# Patient Record
Sex: FEMALE | Race: WHITE | NOT HISPANIC OR LATINO | Employment: UNEMPLOYED | URBAN - METROPOLITAN AREA
[De-identification: names, ages, dates, MRNs, and addresses within clinical notes are randomized per-mention and may not be internally consistent; named-entity substitution may affect disease eponyms.]

---

## 2020-10-22 ENCOUNTER — OFFICE VISIT (OUTPATIENT)
Dept: PRIMARY CARE CLINIC | Facility: CLINIC | Age: 20
End: 2020-10-22
Payer: MEDICAID

## 2020-10-22 VITALS
RESPIRATION RATE: 18 BRPM | OXYGEN SATURATION: 95 % | DIASTOLIC BLOOD PRESSURE: 64 MMHG | SYSTOLIC BLOOD PRESSURE: 102 MMHG | WEIGHT: 210 LBS | BODY MASS INDEX: 35.85 KG/M2 | HEIGHT: 64 IN | HEART RATE: 85 BPM

## 2020-10-22 DIAGNOSIS — G89.29 CHRONIC HIP PAIN, RIGHT: ICD-10-CM

## 2020-10-22 DIAGNOSIS — E53.8 VITAMIN B12 DEFICIENCY: ICD-10-CM

## 2020-10-22 DIAGNOSIS — Z01.419 WELL WOMAN EXAM WITH ROUTINE GYNECOLOGICAL EXAM: ICD-10-CM

## 2020-10-22 DIAGNOSIS — Z30.8 ENCOUNTER FOR OTHER CONTRACEPTIVE MANAGEMENT: ICD-10-CM

## 2020-10-22 DIAGNOSIS — G89.29 CHRONIC FOOT PAIN, RIGHT: ICD-10-CM

## 2020-10-22 DIAGNOSIS — E03.8 OTHER SPECIFIED HYPOTHYROIDISM: ICD-10-CM

## 2020-10-22 DIAGNOSIS — G43.009 MIGRAINE WITHOUT AURA AND WITHOUT STATUS MIGRAINOSUS, NOT INTRACTABLE: ICD-10-CM

## 2020-10-22 DIAGNOSIS — E16.2 HYPOGLYCEMIA: ICD-10-CM

## 2020-10-22 DIAGNOSIS — R35.0 URINE FREQUENCY: ICD-10-CM

## 2020-10-22 DIAGNOSIS — G44.209 TENSION HEADACHE: ICD-10-CM

## 2020-10-22 DIAGNOSIS — E55.9 VITAMIN D DEFICIENCY: ICD-10-CM

## 2020-10-22 DIAGNOSIS — Z00.00 ANNUAL PHYSICAL EXAM: ICD-10-CM

## 2020-10-22 DIAGNOSIS — M79.671 CHRONIC FOOT PAIN, RIGHT: ICD-10-CM

## 2020-10-22 DIAGNOSIS — M25.551 CHRONIC HIP PAIN, RIGHT: ICD-10-CM

## 2020-10-22 PROCEDURE — 99204 PR OFFICE/OUTPT VISIT, NEW, LEVL IV, 45-59 MIN: ICD-10-PCS | Mod: S$GLB,,, | Performed by: NURSE PRACTITIONER

## 2020-10-22 PROCEDURE — 99204 OFFICE O/P NEW MOD 45 MIN: CPT | Mod: S$GLB,,, | Performed by: NURSE PRACTITIONER

## 2020-10-22 RX ORDER — NORGESTIMATE AND ETHINYL ESTRADIOL 7DAYSX3 28
KIT ORAL
COMMUNITY
Start: 2020-08-25 | End: 2020-10-22 | Stop reason: SDUPTHER

## 2020-10-22 RX ORDER — NORGESTIMATE AND ETHINYL ESTRADIOL 7DAYSX3 28
KIT ORAL
Qty: 30 TABLET | Refills: 0 | Status: SHIPPED | OUTPATIENT
Start: 2020-10-22 | End: 2020-10-22 | Stop reason: SDUPTHER

## 2020-10-22 RX ORDER — BUTALBITAL, ASPIRIN, AND CAFFEINE 325; 50; 40 MG/1; MG/1; MG/1
1 CAPSULE ORAL EVERY 6 HOURS PRN
Qty: 90 CAPSULE | Refills: 0 | Status: SHIPPED | OUTPATIENT
Start: 2020-10-22 | End: 2021-10-21 | Stop reason: SDUPTHER

## 2020-10-22 RX ORDER — NORGESTIMATE AND ETHINYL ESTRADIOL 7DAYSX3 28
KIT ORAL
Qty: 30 TABLET | Refills: 0 | Status: SHIPPED | OUTPATIENT
Start: 2020-10-22 | End: 2020-11-09 | Stop reason: SDUPTHER

## 2020-10-22 NOTE — PATIENT INSTRUCTIONS
RTC in 3 months for F/U or sooner if needed.    Keep appts with specialists as scheduled.    Instructed patient to report to nearest ER or call 911 if begins to have difficulty breathing, turning blue, chest pain, B/P < 80/60 or >170/100, palpitations, syncope, extreme weakness, or severe H/A. Patient verbalized understanding.       Migraines and Cluster Headaches  Migraines and cluster headaches cause intense, throbbing pain on one side of the head. With a migraine, you may have nausea and vomiting and be sensitive to light and sound. You may also have warning signs, such as flashing lights or loss of parts of your vision, before the pain starts. Migraines are three times more common in women than men. This may be due to hormonal changes during menstruation. Typical migrains may last for 4 to 72 hours untreated.  Cluster headaches recur in groups for days, weeks, or months. The pain is centered around or behind one eye. The eye may also become red or teary, or the eyelid may droop. Migraines and cluster headaches can have many triggers.    Preventing migraines and cluster headaches  Try the following steps:  · Avoid aged cheeses, nuts, beans, chocolate, red wine, or foods that contain caffeine, alcohol, tobacco, nitrates, and MSG.  · Try not to skip meals.  · Dont work in poor lighting.  · Reduce stress as much as you can.  · Get plenty of sleep each night.  · Exercise regularly under your doctors guidance.  · Avoid taking headache medicines for more than 3 days, because of the risk of rebound headaches.  Relieving the pain  Try these suggestions:  · Stay quiet and rest.  · Use cold to numb the pain. Wrap ice or a cold can of soda in a cloth. Hold it against the site of pain for 10 minutes. Repeat every 20 minutes.  · Avoid light. Wear dark glasses, turn out lights, and close the curtains. When outdoors, wear a brimmed hat.  · Drink lots of fluids. Sip caffeine-free flat soda to help relieve nausea.  · See your  doctor if you get migraines or cluster headaches often. There are effective medications to help treat or prevent them.  · Hormone therapy. This may help women whose migraines are related to hormonal changes during menstruation.  Date Last Reviewed: 10/19/2015  © 5603-4713 [employer]. [address] [place] [address], [place] [address]. All rights reserved. This information is not intended as a substitute for professional medical care. Always follow your healthcare professional's instructions.

## 2020-10-22 NOTE — PROGRESS NOTES
Subjective:       Patient ID: Garry Agarwal is a 20 y.o. female.    Chief Complaint: Establish Care, Hip Pain, and Referral (Ortho)    HPI:    19 y/o female in to establish care. [facility] Will request records.    OB/GYN - Takes tri-Sprentic for birth control. Denies se/ar. Needs Pap smear. Reports menstrual cycles are regular.    History of migraines for several years which is controlled well with fioriciet. Migraines occur twice weekly and lying down in dark room helps with migraine.    Reports chronic left hip pain since in high school caused by her playing sports. Was seeing Dr. Owen but she lost her insurance so she could no longer see him. Would like referral sent since she now has Medicaid. Also with chronic right foot pain since high school occurred in high school while playing sports. Reports it is difficult to walk on her foot for long periods because of the pain. Would like to see podiatrist for her foot pain. Currently deals with pain but takes Tylenol if needed for pain.    No other health history reported.                 History reviewed. No pertinent past medical history.    History reviewed. No pertinent surgical history.    Family History   Problem Relation Age of Onset    Diabetes Mother     Diabetes Father     Stroke Maternal Uncle     Dementia Maternal Grandmother     Dementia Maternal Grandfather        Social History     Tobacco Use    Smoking status: Never Smoker    Smokeless tobacco: Never Used   Substance Use Topics    Alcohol use: Yes     Frequency: Monthly or less    Drug use: Never       Patient Active Problem List   Diagnosis    Chronic foot pain, right    Chronic hip pain, right    Migraine without aura and without status migrainosus, not intractable         There is no immunization history on file for this patient.        Review of Systems   Constitutional: Negative for activity change, appetite change, chills,  "diaphoresis, fatigue and fever.   HENT: Negative for congestion, ear pain, rhinorrhea, sinus pressure, sinus pain, sneezing, sore throat, tinnitus and trouble swallowing.    Eyes: Negative for photophobia, pain and visual disturbance.   Respiratory: Negative for cough, chest tightness, shortness of breath and wheezing.    Cardiovascular: Negative for chest pain, palpitations and leg swelling.   Gastrointestinal: Negative for abdominal distention, abdominal pain, blood in stool, constipation, diarrhea, nausea and vomiting.   Endocrine: Negative for polydipsia, polyphagia and polyuria.   Genitourinary: Negative for dysuria, frequency, hematuria and urgency.   Musculoskeletal: Positive for myalgias (left hip and right foot). Negative for arthralgias, back pain, gait problem, neck pain and neck stiffness.   Skin: Negative for color change and rash.   Allergic/Immunologic: Negative for environmental allergies, food allergies and immunocompromised state.   Neurological: Positive for headaches. Negative for dizziness, syncope, weakness, light-headedness and numbness.   Hematological: Negative for adenopathy. Does not bruise/bleed easily.   Psychiatric/Behavioral: Negative for behavioral problems, confusion and sleep disturbance. The patient is not nervous/anxious.      Objective:     Vitals:    10/22/20 1137   BP: 102/64   BP Location: Right arm   Patient Position: Sitting   BP Method: Large (Manual)   Pulse: 85   Resp: 18   SpO2: 95%   Weight: 95.3 kg (210 lb)   Height: 5' 4" (1.626 m)       Physical Exam  Vitals signs and nursing note reviewed.   Constitutional:       General: She is not in acute distress.     Appearance: Normal appearance. She is not toxic-appearing or diaphoretic.   HENT:      Head: Normocephalic and atraumatic.      Right Ear: Tympanic membrane normal.      Left Ear: Tympanic membrane normal.      Nose: Nose normal.      Mouth/Throat:      Mouth: Mucous membranes are moist.      Pharynx: No " oropharyngeal exudate or posterior oropharyngeal erythema.   Eyes:      General: Lids are normal.      Conjunctiva/sclera: Conjunctivae normal.      Pupils: Pupils are equal, round, and reactive to light.   Neck:      Musculoskeletal: Normal range of motion. Normal range of motion. No neck rigidity or muscular tenderness.      Thyroid: No thyromegaly.      Vascular: No JVD.   Cardiovascular:      Rate and Rhythm: Normal rate and regular rhythm.      Pulses: Normal pulses.      Heart sounds: Normal heart sounds. No murmur.   Pulmonary:      Effort: Pulmonary effort is normal. No respiratory distress.      Breath sounds: Normal breath sounds. No stridor. No wheezing, rhonchi or rales.   Abdominal:      General: Bowel sounds are normal. There is no distension.      Palpations: Abdomen is soft.      Tenderness: There is no abdominal tenderness.   Musculoskeletal:         General: Tenderness (left hip and right foot) present. No swelling.      Left hip: She exhibits tenderness.      Right lower leg: No edema.      Left lower leg: No edema.      Right foot: Decreased range of motion. Tenderness present. No deformity or foot drop.      Left foot: Normal range of motion.   Lymphadenopathy:      Cervical: No cervical adenopathy.   Skin:     General: Skin is warm and dry.      Capillary Refill: Capillary refill takes less than 2 seconds.      Coloration: Skin is not jaundiced or pale.      Findings: No bruising, erythema or rash.   Neurological:      General: No focal deficit present.      Mental Status: She is alert and oriented to person, place, and time.      Motor: Motor function is intact.      Gait: Gait is intact.      Deep Tendon Reflexes: Reflexes are normal and symmetric.   Psychiatric:         Mood and Affect: Mood and affect normal.         Behavior: Behavior normal. Behavior is cooperative.         No results found for any previous visit.         Assessment:      1. Well woman exam with routine gynecological exam     2. Chronic hip pain, right    3. Encounter for other contraceptive management    4. Chronic foot pain, right    5. Tension headache    6. Annual physical exam    7. Urine frequency    8. Hypoglycemia    9. Other specified hypothyroidism    10. Vitamin D deficiency    11. Vitamin B12 deficiency    12. Migraine without aura and without status migrainosus, not intractable          Plan:     Well woman exam with routine gynecological exam  Comments:  Referred to OB/GYN Dr. Sagastume for well woman exam.  Orders:  -     Ambulatory referral/consult to Obstetrics / Gynecology; Future; Expected date: 10/29/2020    Chronic hip pain, right  Comments:   Referred to Dr. moy per pt's request to evaluate.  Orders:  -     Ambulatory referral/consult to Orthopedics; Future; Expected date: 10/29/2020    Encounter for other contraceptive management  Comments:  Refilled Birth control pills. Referred to Dr. Sagastume OB/GYN for future management.  Orders:  -     Discontinue: TRI-SPRINTEC, 28, 0.18/0.215/0.25 mg-35 mcg (28) tablet; TK 1 T PO D  Dispense: 30 tablet; Refill: 0  -     TRI-SPRINTEC, 28, 0.18/0.215/0.25 mg-35 mcg (28) tablet; TK 1 T PO D  Dispense: 30 tablet; Refill: 0    Chronic foot pain, right  Comments:   Referred to Dr. Handy to evaluate and manage.  Orders:  -     Ambulatory referral/consult to Podiatry; Future; Expected date: 10/29/2020    Tension headache  Comments:  Refilled fioricet as needed. Will monitor.  Orders:  -     butalbital-aspirin-caffeine -40 mg (FIORINAL) -40 mg Cap; Take 1 capsule by mouth every 6 (six) hours as needed.  Dispense: 90 capsule; Refill: 0  -     CBC auto differential; Future; Expected date: 10/22/2020  -     Comprehensive Metabolic Panel; Future; Expected date: 10/22/2020    Annual physical exam  Comments:  Will review lab results and determine POC based on results.  Orders:  -     CBC auto differential; Future; Expected date: 10/22/2020  -     Comprehensive Metabolic  Panel; Future; Expected date: 10/22/2020  -     Hemoglobin A1C; Future; Expected date: 10/22/2020  -     Lipid Panel; Future; Expected date: 10/22/2020  -     TSH; Future; Expected date: 10/22/2020    Urine frequency  Comments:  Will review lab results and determine POC based on results.  Orders:  -     Urinalysis, Reflex to Urine Culture Urine, Clean Catch; Future    Hypoglycemia  Comments:  Will review lab results and determine POC based on results.  Orders:  -     Hemoglobin A1C; Future; Expected date: 10/22/2020    Other specified hypothyroidism  Comments:  Will review lab results and determine POC based on results.  Orders:  -     T4, Free; Future; Expected date: 10/22/2020  -     TSH; Future; Expected date: 10/22/2020  -     T3, Free; Future; Expected date: 10/22/2020    Vitamin D deficiency  Comments:  Will review lab results and determine POC based on results.  Orders:  -     Vitamin D; Future; Expected date: 10/22/2020    Vitamin B12 deficiency  Comments:  Will review lab results and determine POC based on results.  Orders:  -     Vitamin B12 & Folate; Future; Expected date: 10/22/2020    Migraine without aura and without status migrainosus, not intractable  Comments:  Refilled fioricet as needed. Will monitor.  Orders:  -     CBC auto differential; Future; Expected date: 10/22/2020  -     Comprehensive Metabolic Panel; Future; Expected date: 10/22/2020       Will review lab results and determine POC based on results.      Current Outpatient Medications   Medication Sig Dispense Refill    TRI-SPRINTEC, 28, 0.18/0.215/0.25 mg-35 mcg (28) tablet TK 1 T PO D 30 tablet 0    butalbital-aspirin-caffeine -40 mg (FIORINAL) -40 mg Cap Take 1 capsule by mouth every 6 (six) hours as needed. 90 capsule 0     No current facility-administered medications for this visit.        Medications Discontinued During This Encounter   Medication Reason    TRI-SPRINTEC, 28, 0.18/0.215/0.25 mg-35 mcg (28) tablet  Reorder    TRI-SPRINTEC, 28, 0.18/0.215/0.25 mg-35 mcg (28) tablet Reorder       Health Maintenance   Topic Date Due    Hepatitis C Screening  2000    Lipid Panel  2000    HPV Vaccines (1 - 2-dose series) 06/11/2011    Chlamydia Screening  06/11/2015    TETANUS VACCINE  06/11/2018       Patient Instructions   RTC in 3 months for F/U or sooner if needed.    Keep appts with specialists as scheduled.    Instructed patient to report to nearest ER or call 911 if begins to have difficulty breathing, turning blue, chest pain, B/P < 80/60 or >170/100, palpitations, syncope, extreme weakness, or severe H/A. Patient verbalized understanding.       Migraines and Cluster Headaches  Migraines and cluster headaches cause intense, throbbing pain on one side of the head. With a migraine, you may have nausea and vomiting and be sensitive to light and sound. You may also have warning signs, such as flashing lights or loss of parts of your vision, before the pain starts. Migraines are three times more common in women than men. This may be due to hormonal changes during menstruation. Typical migrains may last for 4 to 72 hours untreated.  Cluster headaches recur in groups for days, weeks, or months. The pain is centered around or behind one eye. The eye may also become red or teary, or the eyelid may droop. Migraines and cluster headaches can have many triggers.    Preventing migraines and cluster headaches  Try the following steps:  · Avoid aged cheeses, nuts, beans, chocolate, red wine, or foods that contain caffeine, alcohol, tobacco, nitrates, and MSG.  · Try not to skip meals.  · Dont work in poor lighting.  · Reduce stress as much as you can.  · Get plenty of sleep each night.  · Exercise regularly under your doctors guidance.  · Avoid taking headache medicines for more than 3 days, because of the risk of rebound headaches.  Relieving the pain  Try these suggestions:  · Stay quiet and rest.  · Use cold to numb  the pain. Wrap ice or a cold can of soda in a cloth. Hold it against the site of pain for 10 minutes. Repeat every 20 minutes.  · Avoid light. Wear dark glasses, turn out lights, and close the curtains. When outdoors, wear a brimmed hat.  · Drink lots of fluids. Sip caffeine-free flat soda to help relieve nausea.  · See your doctor if you get migraines or cluster headaches often. There are effective medications to help treat or prevent them.  · Hormone therapy. This may help women whose migraines are related to hormonal changes during menstruation.  Date Last Reviewed: 10/19/2015  © 7595-0238 [employer]. [address] [place] [address], [place] [address]. All rights reserved. This information is not intended as a substitute for professional medical care. Always follow your healthcare professional's instructions.          Risks, benefits, and alternatives discussed with patient, Patient verbalized understanding of discussed plan of care. Asked patient if any further questions, answered no.    Future Appointments   Date Time Provider Department Center   1/20/2021 11:00 AM Sonya Lopez NP [facility] I spent 25 minutes with the patient face-to-face with more than 50% of time spent counseling about medication and patient's condition.         Sonya Lopez NP

## 2020-11-02 LAB
ABS NRBC COUNT: 0 X 10 3/UL (ref 0–0.01)
ABSOLUTE BASOPHIL: 0.03 X 10 3/UL (ref 0–0.22)
ABSOLUTE EOSINOPHIL: 0.24 X 10 3/UL (ref 0.04–0.54)
ABSOLUTE IMMATURE GRAN: 0.02 X 10 3/UL (ref 0–0.04)
ABSOLUTE LYMPHOCYTE: 2.24 X 10 3/UL (ref 0.86–4.75)
ABSOLUTE MONOCYTE: 0.57 X 10 3/UL (ref 0.22–1.08)
ALBUMIN SERPL-MCNC: 4 G/DL (ref 3.5–5.2)
ALBUMIN/GLOB SERPL ELPH: 1.4 {RATIO} (ref 1–2.7)
ALP ISOS SERPL LEV INH-CCNC: 88 U/L (ref 35–105)
ALT (SGPT): 12 U/L (ref 0–33)
AMORPH URATE CRY URNS QL MICRO: NEGATIVE
ANION GAP SERPL CALC-SCNC: 10 MMOL/L (ref 8–17)
AST SERPL-CCNC: 15 U/L (ref 0–32)
B12: 284 PG/ML (ref 232–1245)
BACTERIA #/AREA URNS HPF: ABNORMAL /[HPF]
BASOPHILS NFR BLD: 0.4 % (ref 0.2–1.2)
BILIRUB UR QL STRIP: NEGATIVE
BILIRUBIN, TOTAL: 0.29 MG/DL (ref 0–1.2)
BUN/CREAT SERPL: 5.9 (ref 6–20)
CALCIUM SERPL-MCNC: 9.1 MG/DL (ref 8.6–10.2)
CARBON DIOXIDE, CO2: 26 MMOL/L (ref 22–29)
CHLORIDE: 105 MMOL/L (ref 98–107)
CHOLEST SERPL-MSCNC: 127 MG/DL (ref 100–200)
CLARITY UR: CLEAR
COLOR UR: YELLOW
CREAT SERPL-MCNC: 0.78 MG/DL (ref 0.5–0.9)
EOSINOPHIL NFR BLD: 3.4 % (ref 0.7–7)
EPITHELIAL CELLS: ABNORMAL
ESTIMATED AVERAGE GLUCOSE: 106 MG/DL
FOLATE SERPL-MCNC: 5.83 NG/ML (ref 5–27.2)
GFR ESTIMATION: 94.16
GLOBULIN: 2.8 G/DL (ref 1.5–4.5)
GLUCOSE (UA): NEGATIVE MG/DL
GLUCOSE: 96 MG/DL (ref 74–106)
HBA1C MFR BLD: 5.3 % (ref 4–6)
HCT VFR BLD AUTO: 38.7 % (ref 37–47)
HDLC SERPL-MCNC: 54 MG/DL
HGB BLD-MCNC: 12.6 G/DL (ref 12–16)
IMMATURE GRANULOCYTES: 0.3 % (ref 0–0.5)
KETONES UR QL STRIP: NEGATIVE MG/DL
LDL/HDL RATIO: 1 (ref 1–3)
LDLC SERPL CALC-MCNC: 55.4 MG/DL (ref 0–100)
LEUKOCYTE ESTERASE UR QL STRIP: NEGATIVE
LYMPHOCYTES NFR BLD: 31.5 % (ref 19.3–53.1)
MCH RBC QN AUTO: 27.5 PG (ref 27–32)
MCHC RBC AUTO-ENTMCNC: 32.6 G/DL (ref 32–36)
MCV RBC AUTO: 84.5 FL (ref 82–100)
MONOCYTES NFR BLD: 8 % (ref 4.7–12.5)
MUCOUS THREADS URNS QL MICRO: ABNORMAL
NEUTROPHILS # BLD AUTO: 4.01 X 10 3/UL (ref 2.15–7.56)
NEUTROPHILS NFR BLD: 56.4 %
NITRITE UR QL STRIP: NEGATIVE
NUCLEATED RED BLOOD CELLS: 0 /100 WBC (ref 0–0.2)
OCCULT BLOOD: ABNORMAL
PH, URINE: 6 (ref 5–7.5)
PLATELET # BLD AUTO: 268 X 10 3/UL (ref 135–400)
POTASSIUM: 3.9 MMOL/L (ref 3.5–5.1)
PROT SNV-MCNC: 6.8 G/DL (ref 6.4–8.3)
PROT UR QL STRIP: NEGATIVE MG/DL
RBC # BLD AUTO: 4.58 X 10 6/UL (ref 4.2–5.4)
RBC/HPF: ABNORMAL
RDW-SD: 38.8 FL (ref 37–54)
SODIUM: 141 MMOL/L (ref 136–145)
SP GR UR STRIP: 1.01 (ref 1–1.03)
T3FREE SERPL DIALY-MCNC: 3.69 PG/ML (ref 2–4.4)
T4, FREE: 1.19 NG/DL (ref 0.93–1.7)
TRIGL SERPL-MCNC: 88 MG/DL (ref 0–150)
TSH SERPL DL<=0.005 MIU/L-ACNC: 1.49 UIU/ML (ref 0.27–4.2)
UREA NITROGEN (BUN): 4.6 MG/DL (ref 6–20)
UROBILINOGEN, URINE: NORMAL E.U./DL (ref 0–1)
VITAMIN D (25OHD): 12.8 NG/ML
WBC # BLD: 7.11 X 10 3/UL (ref 4.3–10.8)
WBC/HPF: NEGATIVE

## 2020-11-03 ENCOUNTER — TELEPHONE (OUTPATIENT)
Dept: PRIMARY CARE CLINIC | Facility: CLINIC | Age: 20
End: 2020-11-03

## 2020-11-03 DIAGNOSIS — E55.9 VITAMIN D DEFICIENCY: Primary | ICD-10-CM

## 2020-11-03 RX ORDER — ASPIRIN 325 MG
50000 TABLET, DELAYED RELEASE (ENTERIC COATED) ORAL WEEKLY
Qty: 4 CAPSULE | Refills: 2 | Status: SHIPPED | OUTPATIENT
Start: 2020-11-03 | End: 2021-01-20 | Stop reason: SDUPTHER

## 2020-11-03 NOTE — TELEPHONE ENCOUNTER
All labs are within normal range except for her Vitamin D being low. Will start on Vitamin D2 50,000 units weekly. Will monitor.

## 2020-11-06 ENCOUNTER — TELEPHONE (OUTPATIENT)
Dept: PRIMARY CARE CLINIC | Facility: CLINIC | Age: 20
End: 2020-11-06

## 2020-11-06 NOTE — TELEPHONE ENCOUNTER
----- Message from Edi León sent at 11/4/2020  1:11 PM CST -----  Contact: self  Requesting call back regarding questions about pt prescription. Please call back at [phone].

## 2020-11-09 ENCOUNTER — OFFICE VISIT (OUTPATIENT)
Dept: OBSTETRICS AND GYNECOLOGY | Facility: CLINIC | Age: 20
End: 2020-11-09
Payer: MEDICAID

## 2020-11-09 VITALS
HEIGHT: 64 IN | DIASTOLIC BLOOD PRESSURE: 70 MMHG | SYSTOLIC BLOOD PRESSURE: 102 MMHG | HEART RATE: 106 BPM | BODY MASS INDEX: 36.19 KG/M2 | WEIGHT: 212 LBS

## 2020-11-09 DIAGNOSIS — N93.9 ABNORMAL UTERINE BLEEDING (AUB): ICD-10-CM

## 2020-11-09 DIAGNOSIS — Z01.419 WELL WOMAN EXAM WITH ROUTINE GYNECOLOGICAL EXAM: Primary | ICD-10-CM

## 2020-11-09 DIAGNOSIS — Z30.8 ENCOUNTER FOR OTHER CONTRACEPTIVE MANAGEMENT: ICD-10-CM

## 2020-11-09 PROCEDURE — 99385 PREV VISIT NEW AGE 18-39: CPT | Mod: S$GLB,,, | Performed by: STUDENT IN AN ORGANIZED HEALTH CARE EDUCATION/TRAINING PROGRAM

## 2020-11-09 PROCEDURE — 99385 PR PREVENTIVE VISIT,NEW,18-39: ICD-10-PCS | Mod: S$GLB,,, | Performed by: STUDENT IN AN ORGANIZED HEALTH CARE EDUCATION/TRAINING PROGRAM

## 2020-11-09 RX ORDER — NORGESTIMATE AND ETHINYL ESTRADIOL 7DAYSX3 28
KIT ORAL
Qty: 30 TABLET | Refills: 0 | Status: SHIPPED | OUTPATIENT
Start: 2020-11-09 | End: 2021-10-21

## 2020-11-09 NOTE — PROGRESS NOTES
"Chief Complaint: Annual Exam    HPI: 20 y.o. G0 here for Annual Exam. She reports a h/o AUB, recently restarted her OCPs, prior to that her cycles were every other month lasting 3 days, with mild VB. She reports trying 7 different types of OCPs. She needs a refill on her current OCPs. She has no other complaints today.      Review of Systems   Constitutional: Negative for chills and fever.   HENT: Negative for congestion and sore throat.    Respiratory: Negative for cough and shortness of breath.    Cardiovascular: Negative for chest pain.   Gastrointestinal: Negative for abdominal pain, nausea and vomiting.   Genitourinary: Negative for dysuria and frequency.   Musculoskeletal: Negative for back pain.   Skin: Negative for rash.   Neurological: Negative for headaches.   All other systems reviewed and are negative.    Past Medical History migraines w/o aura  Past Surgical History none  Past OB History: G0  Past Gyn History: Denies prior abnormal pap smears, denies STD's, menstrual history as above.   Contraception History: OCP (estrogen/progesterone)  Social History: + etoh use, denies t/d  Family History denies breast, colon, ovarian, or uterine cancer  Allergies: NKDA  Medications   Current Outpatient Medications   Medication Instructions    butalbital-aspirin-caffeine -40 mg (FIORINAL) -40 mg Cap 1 capsule, Oral, Every 6 hours PRN    cholecalciferol (vitamin D3) (DECARA) 50,000 Units, Oral, Weekly    TRI-SPRINTEC, 28, 0.18/0.215/0.25 mg-35 mcg (28) tablet TK 1 T PO D     Physical Exam:  Vitals:    11/09/20 0957   BP: 102/70   Pulse: 106   Weight: 96.2 kg (212 lb)   Height: 5' 4" (1.626 m)     Body mass index is 36.39 kg/m².  Physical Exam   Constitutional: She is oriented to person, place, and time and well-developed, well-nourished, and in no distress.   HENT:   Head: Normocephalic and atraumatic.   Eyes: Pupils are equal, round, and reactive to light. EOM are normal.   Neck: Normal range of " motion. Neck supple.   Pulmonary/Chest: Effort normal. No respiratory distress.   Pt deferred breast exam   Abdominal: Soft. She exhibits no distension. There is no abdominal tenderness.   Genitourinary:    Genitourinary Comments: Pt deferred exam     Musculoskeletal: Normal range of motion.         General: No tenderness or edema.   Neurological: She is alert and oriented to person, place, and time.   Skin: Skin is warm.      ASSESSMENT:   Patient is a 20 y.o. G0 who presents for annual exam     Patient Active Problem List   Diagnosis    Chronic foot pain, right    Chronic hip pain, right    Migraine without aura and without status migrainosus, not intractable    Abnormal uterine bleeding (AUB)     PLAN:  Pt reports having STDS screening 3 months ago - all negative  Pap smear not indicated until 20 yo  Refilled OCPs today  Pre-conception counseling- folic acid and PNV  Counseling on self-breast exams, diet, and exercise  RTC in 6 months for fu of AUB

## 2020-11-09 NOTE — LETTER
November 9, 2020      Joann Lopez, ANKUR  [address]  [place] [address]           [place] [facility]  [address]  [place] [address]  Phone: [phone]  Fax: [phone]          Patient: Garry Agarwal   MR Number: [id number]   YOB: 2000   Date of Visit: 11/9/2020       Dear Joann Lpoez:    Thank you for referring Garry Agarwal to me for evaluation. Attached you will find relevant portions of my assessment and plan of care.    If you have questions, please do not hesitate to call me. I look forward to following Garry Agarwal along with you.    Sincerely,    Goldy Sagastume MD    Enclosure  CC:  No Recipients    If you would like to receive this communication electronically, please contact [email or web address] or [phone] to request more information on [employer] Link access.    For providers and/or their staff who would like to refer a patient to [facility], please contact us through our one-stop-shop provider referral line, [facility], at [phone].    If you feel you have received this communication in error or would no longer like to receive these types of communications, please e-mail [email or web address]

## 2021-01-20 ENCOUNTER — OFFICE VISIT (OUTPATIENT)
Dept: PRIMARY CARE CLINIC | Facility: CLINIC | Age: 21
End: 2021-01-20
Payer: MEDICAID

## 2021-01-20 VITALS
SYSTOLIC BLOOD PRESSURE: 122 MMHG | WEIGHT: 208 LBS | RESPIRATION RATE: 18 BRPM | BODY MASS INDEX: 35.51 KG/M2 | OXYGEN SATURATION: 99 % | HEIGHT: 64 IN | HEART RATE: 99 BPM | DIASTOLIC BLOOD PRESSURE: 78 MMHG

## 2021-01-20 DIAGNOSIS — Z01.419 WELL WOMAN EXAM WITH ROUTINE GYNECOLOGICAL EXAM: ICD-10-CM

## 2021-01-20 DIAGNOSIS — E55.9 VITAMIN D DEFICIENCY: ICD-10-CM

## 2021-01-20 DIAGNOSIS — F41.9 ANXIETY AND DEPRESSION: Primary | ICD-10-CM

## 2021-01-20 DIAGNOSIS — F32.A ANXIETY AND DEPRESSION: Primary | ICD-10-CM

## 2021-01-20 PROCEDURE — 99213 OFFICE O/P EST LOW 20 MIN: CPT | Mod: S$GLB,,, | Performed by: NURSE PRACTITIONER

## 2021-01-20 PROCEDURE — 99213 PR OFFICE/OUTPT VISIT, EST, LEVL III, 20-29 MIN: ICD-10-PCS | Mod: S$GLB,,, | Performed by: NURSE PRACTITIONER

## 2021-01-20 RX ORDER — ESCITALOPRAM OXALATE 10 MG/1
10 TABLET ORAL DAILY
Qty: 30 TABLET | Refills: 2 | Status: SHIPPED | OUTPATIENT
Start: 2021-01-20 | End: 2021-04-21

## 2021-01-20 RX ORDER — ASPIRIN 325 MG
50000 TABLET, DELAYED RELEASE (ENTERIC COATED) ORAL WEEKLY
Qty: 4 CAPSULE | Refills: 2 | Status: SHIPPED | OUTPATIENT
Start: 2021-01-20 | End: 2021-04-21 | Stop reason: ALTCHOICE

## 2021-04-21 ENCOUNTER — OFFICE VISIT (OUTPATIENT)
Dept: PRIMARY CARE CLINIC | Facility: CLINIC | Age: 21
End: 2021-04-21
Payer: MEDICAID

## 2021-04-21 VITALS
SYSTOLIC BLOOD PRESSURE: 102 MMHG | RESPIRATION RATE: 18 BRPM | BODY MASS INDEX: 35.2 KG/M2 | OXYGEN SATURATION: 98 % | HEART RATE: 108 BPM | DIASTOLIC BLOOD PRESSURE: 64 MMHG | WEIGHT: 206.19 LBS | HEIGHT: 64 IN

## 2021-04-21 DIAGNOSIS — G43.009 MIGRAINE WITHOUT AURA AND WITHOUT STATUS MIGRAINOSUS, NOT INTRACTABLE: ICD-10-CM

## 2021-04-21 DIAGNOSIS — G47.09 OTHER INSOMNIA: ICD-10-CM

## 2021-04-21 PROBLEM — N93.9 ABNORMAL UTERINE BLEEDING (AUB): Status: RESOLVED | Noted: 2020-11-09 | Resolved: 2021-04-21

## 2021-04-21 PROCEDURE — 99213 PR OFFICE/OUTPT VISIT, EST, LEVL III, 20-29 MIN: ICD-10-PCS | Mod: S$GLB,,, | Performed by: NURSE PRACTITIONER

## 2021-04-21 PROCEDURE — 99213 OFFICE O/P EST LOW 20 MIN: CPT | Mod: S$GLB,,, | Performed by: NURSE PRACTITIONER

## 2021-04-21 RX ORDER — TRAZODONE HYDROCHLORIDE 50 MG/1
50 TABLET ORAL NIGHTLY
Qty: 30 TABLET | Refills: 5 | Status: SHIPPED | OUTPATIENT
Start: 2021-04-21 | End: 2021-10-21 | Stop reason: SDUPTHER

## 2021-05-04 PROBLEM — S73.191A OTHER SPRAIN OF RIGHT HIP, INITIAL ENCOUNTER: Status: ACTIVE | Noted: 2021-05-04

## 2021-10-21 ENCOUNTER — OFFICE VISIT (OUTPATIENT)
Dept: PRIMARY CARE CLINIC | Facility: CLINIC | Age: 21
End: 2021-10-21
Payer: MEDICAID

## 2021-10-21 VITALS
HEIGHT: 64 IN | SYSTOLIC BLOOD PRESSURE: 120 MMHG | OXYGEN SATURATION: 100 % | DIASTOLIC BLOOD PRESSURE: 68 MMHG | BODY MASS INDEX: 36.88 KG/M2 | WEIGHT: 216 LBS | HEART RATE: 100 BPM

## 2021-10-21 DIAGNOSIS — Z00.00 ANNUAL PHYSICAL EXAM: Primary | ICD-10-CM

## 2021-10-21 DIAGNOSIS — E55.9 VITAMIN D DEFICIENCY: ICD-10-CM

## 2021-10-21 DIAGNOSIS — G47.09 OTHER INSOMNIA: ICD-10-CM

## 2021-10-21 DIAGNOSIS — Z13.1 SCREENING FOR DIABETES MELLITUS (DM): ICD-10-CM

## 2021-10-21 DIAGNOSIS — G44.209 TENSION HEADACHE: ICD-10-CM

## 2021-10-21 PROCEDURE — 99214 PR OFFICE/OUTPT VISIT, EST, LEVL IV, 30-39 MIN: ICD-10-PCS | Mod: S$GLB,,, | Performed by: NURSE PRACTITIONER

## 2021-10-21 PROCEDURE — 99214 OFFICE O/P EST MOD 30 MIN: CPT | Mod: S$GLB,,, | Performed by: NURSE PRACTITIONER

## 2021-10-21 RX ORDER — BUTALBITAL, ASPIRIN, AND CAFFEINE 325; 50; 40 MG/1; MG/1; MG/1
1 CAPSULE ORAL EVERY 6 HOURS PRN
Qty: 90 CAPSULE | Refills: 0 | Status: SHIPPED | OUTPATIENT
Start: 2021-10-21 | End: 2022-01-18 | Stop reason: SDUPTHER

## 2021-10-21 RX ORDER — TRAZODONE HYDROCHLORIDE 50 MG/1
50 TABLET ORAL NIGHTLY
Qty: 30 TABLET | Refills: 5 | Status: SHIPPED | OUTPATIENT
Start: 2021-10-21

## 2021-10-23 LAB
25(OH)D3 SERPL-MCNC: 14 NG/ML (ref 30–100)
ALBUMIN SERPL-MCNC: 4.1 G/DL (ref 3.6–5.1)
ALBUMIN/GLOB SERPL: 1.5 (CALC) (ref 1–2.5)
ALP SERPL-CCNC: 75 U/L (ref 31–125)
ALT SERPL-CCNC: 11 U/L (ref 6–29)
AST SERPL-CCNC: 14 U/L (ref 10–30)
BASOPHILS # BLD AUTO: 40 CELLS/UL (ref 0–200)
BASOPHILS NFR BLD AUTO: 0.6 %
BILIRUB SERPL-MCNC: 0.5 MG/DL (ref 0.2–1.2)
BUN SERPL-MCNC: 7 MG/DL (ref 7–25)
BUN/CREAT SERPL: NORMAL (CALC) (ref 6–22)
CALCIUM SERPL-MCNC: 9.3 MG/DL (ref 8.6–10.2)
CHLORIDE SERPL-SCNC: 104 MMOL/L (ref 98–110)
CHOLEST SERPL-MCNC: 122 MG/DL
CHOLEST/HDLC SERPL: 2.4 (CALC)
CO2 SERPL-SCNC: 27 MMOL/L (ref 20–32)
CREAT SERPL-MCNC: 0.8 MG/DL (ref 0.5–1.1)
EOSINOPHIL # BLD AUTO: 101 CELLS/UL (ref 15–500)
EOSINOPHIL NFR BLD AUTO: 1.5 %
ERYTHROCYTE [DISTWIDTH] IN BLOOD BY AUTOMATED COUNT: 14.7 % (ref 11–15)
GLOBULIN SER CALC-MCNC: 2.7 G/DL (CALC) (ref 1.9–3.7)
GLUCOSE SERPL-MCNC: 90 MG/DL (ref 65–99)
HBA1C MFR BLD: 5.5 % OF TOTAL HGB
HCT VFR BLD AUTO: 36.6 % (ref 35–45)
HDLC SERPL-MCNC: 50 MG/DL
HGB BLD-MCNC: 11.5 G/DL (ref 11.7–15.5)
LDLC SERPL CALC-MCNC: 58 MG/DL (CALC)
LYMPHOCYTES # BLD AUTO: 1916 CELLS/UL (ref 850–3900)
LYMPHOCYTES NFR BLD AUTO: 28.6 %
MCH RBC QN AUTO: 24.9 PG (ref 27–33)
MCHC RBC AUTO-ENTMCNC: 31.4 G/DL (ref 32–36)
MCV RBC AUTO: 79.2 FL (ref 80–100)
MONOCYTES # BLD AUTO: 663 CELLS/UL (ref 200–950)
MONOCYTES NFR BLD AUTO: 9.9 %
NEUTROPHILS # BLD AUTO: 3980 CELLS/UL (ref 1500–7800)
NEUTROPHILS NFR BLD AUTO: 59.4 %
NONHDLC SERPL-MCNC: 72 MG/DL (CALC)
PLATELET # BLD AUTO: 268 THOUSAND/UL (ref 140–400)
PMV BLD REES-ECKER: 10.4 FL (ref 7.5–12.5)
POTASSIUM SERPL-SCNC: 4.1 MMOL/L (ref 3.5–5.3)
PROT SERPL-MCNC: 6.8 G/DL (ref 6.1–8.1)
RBC # BLD AUTO: 4.62 MILLION/UL (ref 3.8–5.1)
SODIUM SERPL-SCNC: 136 MMOL/L (ref 135–146)
TRIGL SERPL-MCNC: 65 MG/DL
TSH SERPL-ACNC: 2.16 MIU/L
WBC # BLD AUTO: 6.7 THOUSAND/UL (ref 3.8–10.8)

## 2021-10-25 DIAGNOSIS — E55.9 VITAMIN D DEFICIENCY: ICD-10-CM

## 2021-10-25 DIAGNOSIS — D50.8 IRON DEFICIENCY ANEMIA SECONDARY TO INADEQUATE DIETARY IRON INTAKE: Primary | ICD-10-CM

## 2021-10-25 RX ORDER — ASPIRIN 325 MG
50000 TABLET, DELAYED RELEASE (ENTERIC COATED) ORAL WEEKLY
Qty: 4 CAPSULE | Refills: 2 | Status: SHIPPED | OUTPATIENT
Start: 2021-10-25

## 2021-10-25 RX ORDER — LANOLIN ALCOHOL/MO/W.PET/CERES
1 CREAM (GRAM) TOPICAL DAILY
Qty: 30 TABLET | Refills: 2 | Status: SHIPPED | OUTPATIENT
Start: 2021-10-25

## 2021-10-26 ENCOUNTER — TELEPHONE (OUTPATIENT)
Dept: PRIMARY CARE CLINIC | Facility: CLINIC | Age: 21
End: 2021-10-26
Payer: MEDICAID

## 2022-01-17 ENCOUNTER — PATIENT MESSAGE (OUTPATIENT)
Dept: PRIMARY CARE CLINIC | Facility: CLINIC | Age: 22
End: 2022-01-17
Payer: MEDICAID

## 2022-01-17 DIAGNOSIS — G44.209 TENSION HEADACHE: ICD-10-CM

## 2022-01-18 ENCOUNTER — PATIENT MESSAGE (OUTPATIENT)
Dept: PRIMARY CARE CLINIC | Facility: CLINIC | Age: 22
End: 2022-01-18
Payer: MEDICAID

## 2022-01-18 RX ORDER — BUTALBITAL, ASPIRIN, AND CAFFEINE 325; 50; 40 MG/1; MG/1; MG/1
1 CAPSULE ORAL EVERY 6 HOURS PRN
Qty: 30 CAPSULE | Refills: 0 | Status: SHIPPED | OUTPATIENT
Start: 2022-01-18

## 2022-11-16 ENCOUNTER — PATIENT MESSAGE (OUTPATIENT)
Dept: ADMINISTRATIVE | Facility: HOSPITAL | Age: 22
End: 2022-11-16
Payer: MEDICAID

## 2023-05-29 ENCOUNTER — PATIENT MESSAGE (OUTPATIENT)
Dept: ADMINISTRATIVE | Facility: HOSPITAL | Age: 23
End: 2023-05-29
Payer: MEDICAID

## 2023-09-25 ENCOUNTER — PATIENT MESSAGE (OUTPATIENT)
Dept: ADMINISTRATIVE | Facility: HOSPITAL | Age: 23
End: 2023-09-25
Payer: MEDICAID